# Patient Record
(demographics unavailable — no encounter records)

---

## 2025-04-11 NOTE — HEALTH RISK ASSESSMENT
[Good] : ~his/her~  mood as  good [No] : No [1 or 2 (0 pts)] : 1 or 2 (0 points) [Never (0 pts)] : Never (0 points) [0] : 2) Feeling down, depressed, or hopeless: Not at all (0) [Patient reported mammogram was normal] : Patient reported mammogram was normal [None] : None [With Family] : lives with family [Employed] : employed [High School] : high school [] :  [# Of Children ___] : has [unfilled] children [Feels Safe at Home] : Feels safe at home [Smoke Detector] : smoke detector [Carbon Monoxide Detector] : carbon monoxide detector [Never] : Never [Patient reported colonoscopy was normal] : Patient reported colonoscopy was normal [Audit-CScore] : 0 [[id number]] : 0 [Reports changes in hearing] : Reports no changes in hearing [Reports changes in vision] : Reports no changes in vision [Reports changes in dental health] : Reports no changes in dental health [MammogramDate] : 06/2024 [PapSmearDate] : 2023 [ColonoscopyDate] : 10/24 [ColonoscopyComments] : cologuard [FreeTextEntry2] : [employer], getting degree in exercise science, previously baker for 20 years,  [FreeTextEntry3] : 1

## 2025-04-11 NOTE — ASSESSMENT
[Vaccines Reviewed] : Immunizations reviewed today. Please see immunization details in the vaccine log within the immunization flowsheet.  [FreeTextEntry1] : Preventative care -Cologuard negative 10/3/24 -Mammogram and u/s ordered -Will see ob/gyn for screening paps and discuss HRT -She will follow up with derm for skin checks and management of rosacea -H/o of vit d deficiency, will check levels -IMM-tdap administered -F/u one year

## 2025-04-11 NOTE — HISTORY OF PRESENT ILLNESS
[de-identified] : New patient here to establish and for annual exam LMP 5/2024, in perimenopause. Concerned about weight gain and brain fog. Wants to establish with Ob/gyn  Has history of L5/S1 spondylolisthesis, PT has been helping with chronic pain. Interested in establishing with back specialist Rosacea, has tried topical metronidazole with persistence of symptoms

## 2025-04-11 NOTE — HISTORY OF PRESENT ILLNESS
[de-identified] : New patient here to establish and for annual exam LMP 5/2024, in perimenopause. Concerned about weight gain and brain fog. Wants to establish with Ob/gyn  Has history of L5/S1 spondylolisthesis, PT has been helping with chronic pain. Interested in establishing with back specialist Rosacea, has tried topical metronidazole with persistence of symptoms

## 2025-04-11 NOTE — HEALTH RISK ASSESSMENT
[Good] : ~his/her~  mood as  good [No] : No [1 or 2 (0 pts)] : 1 or 2 (0 points) [Never (0 pts)] : Never (0 points) [0] : 2) Feeling down, depressed, or hopeless: Not at all (0) [Patient reported mammogram was normal] : Patient reported mammogram was normal [None] : None [With Family] : lives with family [Employed] : employed [High School] : high school [] :  [# Of Children ___] : has [unfilled] children [Feels Safe at Home] : Feels safe at home [Smoke Detector] : smoke detector [Carbon Monoxide Detector] : carbon monoxide detector [Never] : Never [Patient reported colonoscopy was normal] : Patient reported colonoscopy was normal [Audit-CScore] : 0 [[id number]] : 0 [Reports changes in hearing] : Reports no changes in hearing [Reports changes in vision] : Reports no changes in vision [Reports changes in dental health] : Reports no changes in dental health [MammogramDate] : 06/2024 [PapSmearDate] : 2023 [ColonoscopyDate] : 10/24 [ColonoscopyComments] : cologuard [FreeTextEntry2] : [employer], getting degree in exercise science, previously baker for 20 years,  [FreeTextEntry3] : 1

## 2025-05-05 NOTE — DISCUSSION/SUMMARY
[de-identified] : CC : LBP  hpi 46 F with low back pain for many years. acute on chronic. pt has pain worse with activity. worse with standing and sitting. pain score right now is 4/10. pt has done therapy. PT is making it worse. denies weakness. no b/b incontinence. no fever/chills  patient is NAD, AAOX3 skin is intact, NTTP in LS SPINE mild pain with ROM 5/5 motor strength in BLE SILT L1-S1 BLE POSITIVE straight leg raise Negative carl equal patella/achilles reflex normal gait  I personally reviewed XR LS spine - which demonstrated L5-S1 Isthmic spondy with 8 mm of translation  46 F with LBP in the setting of L5-S1 isthmic spondy. will order mri/ct to better evaluate pathology. she will continue w pt/meds. she is candidate for injections. all the pts questions were sought and answered

## 2025-05-08 NOTE — PLAN
[FreeTextEntry1] : Referred to [employer] for evidence-based information regarding menopause.  Follow-up for HPV vaccine if desired.  Follow-up in 1 year or as needed.

## 2025-05-08 NOTE — HISTORY OF PRESENT ILLNESS
[FreeTextEntry1] : 46-year-old -0-0-1 presents for annual GYN exam.  History of  x 1.  Not currently sexually active.  Last menstrual period about a year ago May 2024.  Overall coping with menopause well.  Mild occasional hot flashes, some brain fog.  Not currently interested in starting HRT.  Remote history of cervical dysplasia.  Never vaccinated for HPV.  Maternal grandmother breast cancer.  No family history of ovarian, colon or uterine cancer.  Lives with 12-year-old daughter.  Going to school for exercise physiology.  Former [employer]. [Patient reported mammogram was normal] : Patient reported mammogram was normal [Patient reported breast sonogram was normal] : Patient reported breast sonogram was normal [Patient reported PAP Smear was normal] : Patient reported PAP Smear was normal [Patient reported colonoscopy was normal] : Patient reported colonoscopy was normal [Mammogramdate] : 06/24 [BreastSonogramDate] : 06/24 [PapSmeardate] : 06/23 [ColonoscopyDate] : 08/24 [TextBox_[id number]] : John

## 2025-05-08 NOTE — PHYSICAL EXAM
[MA] : MA [Appropriately responsive] : appropriately responsive [Alert] : alert [No Acute Distress] : no acute distress [Soft] : soft [Non-tender] : non-tender [Non-distended] : non-distended [No HSM] : No HSM [No Mass] : no mass [Oriented x3] : oriented x3 [Examination Of The Breasts] : a normal appearance [No Discharge] : no discharge [No Masses] : no breast masses were palpable [No Lesions] : no lesions  [Labia Majora] : normal [Labia Minora] : normal [Pink Rugae] : pink rugae [Normal] : normal [Uterine Adnexae] : normal [FreeTextEntry2] : Wilfredo [FreeTextEntry6] : No bilateral axillary LAD [Tenderness] : nontender [Enlarged ___ wks] : not enlarged [FreeTextEntry5] : No CMT [FreeTextEntry8] : No adnexal masses or tenderness

## 2025-06-20 NOTE — HISTORY OF PRESENT ILLNESS
[de-identified] : Kellie is here for follow-up of high cholesterol.  She has been working with a  and has been able to gain muscle and lose fat.  She is feeling better than she has in a long time.  Making healthy diet choices.  Also officially in menopause.  Had questions about HRT.  Recently saw Carolann at OB/GYN office.

## 2025-06-20 NOTE — ASSESSMENT
[FreeTextEntry1] : Hyperlipidemia - Congratulated her on weight loss and improve lifestyle habits - Will recheck lipids - Discussed with what a calcium score is and how it helps determine need for statin although I expect her cholesterol to be lowered naturally with lifestyle changes  Menopause - Discussed role of hormone replacement for hot flashes - Discussed contraindications including estrogen sensitive cancers, history of blood clots, or liver disease -Also answered questions regarding vaginal estrogen. She denies atrophic vaginitis symptoms currently

## 2025-07-21 NOTE — ASSESSMENT
[FreeTextEntry1] : #Rosacea-  -chronic, flaring -I have discussed the chronic nature and course of this condition mainly ET type, also papulopustular by hx start sodium sulfacetamide wash daily tried and failed metrocream start azelaic acid daily trigger avoidance, sun protection reviewed -reviewed tx with cutera excel V if desired, reviewed risks including pain, bruising, blister or scab formation, post inflammatory hyper or hypopigmentation, need for repeat treatment, recurrence, development of new lesions over time, and out of pocket cost per session. excel V cheeks only $300 per session  RTC 3 mos or prn

## 2025-07-21 NOTE — HISTORY OF PRESENT ILLNESS
[FreeTextEntry1] : rosacea [de-identified] : #Rosacea Duration: on and off for years  notices bumps sometimes Location: face  Topical treatments attempted: metronidazole cream without much improvement Oral treatments attempted: none  triggers include spicy food, heat wears sunscreen daily